# Patient Record
(demographics unavailable — no encounter records)

---

## 2024-11-25 NOTE — ASSESSMENT
[FreeTextEntry1] : Marie is a very pleasant 32 year old F referred for initial GI evaluation for elevated liver enzymes.  service used for this encounter. patient thought this was an appointment for her kids and did not bring her labs or records with her. has them at home, will bring PCP records.   she had c section and tubal ligation in April. 10 months prior to that she had c section for twin pregnancy. she reports back pain since her pregnancy. does not take NSAIds. However, she was sent home with Ibuprofen as her CS in April. no post prandial symptoms.   * we have requested from PCP to review re: consult, labs , etc.  She will bring the records she has at home  * She has not had labs over the past month and we will check liver enzymes as well as screening for viral hepatitis and autoimmune/ familial liver disease  * Ultrasound of abdomen ordered.   Reviewed and reconciled medications, allergies, PMHx, PSHx, SocHx, FMHx. Reviewed imaging, blood work, diagnostic testing, discussed with patient. Further recommendations pending results of above work up and evaluation. Recommendations reviewed with patient during this office visit, and all questions answered; Patient instructed on the importance of follow up and verbalizes understanding.  I personally performed the evaluation and management (E/M) services for this established patient who presents today with (a) new problem(s)/exacerbation of (an) existing condition(s). That E/M includes conducting the examination, assessing all new/exacerbated conditions, and establishing a new plan of care this new problem/exacerbated condition to be followed going forward.

## 2024-11-25 NOTE — PHYSICAL EXAM
[General Appearance - Alert] : alert [General Appearance - In No Acute Distress] : in no acute distress [Neck Appearance] : the appearance of the neck was normal [Neck Cervical Mass (___cm)] : no neck mass was observed [Jugular Venous Distention Increased] : there was no jugular-venous distention [Thyroid Diffuse Enlargement] : the thyroid was not enlarged [Thyroid Nodule] : there were no palpable thyroid nodules [Auscultation Breath Sounds / Voice Sounds] : lungs were clear to auscultation bilaterally [Heart Rate And Rhythm] : heart rate was normal and rhythm regular [Heart Sounds] : normal S1 and S2 [Heart Sounds Gallop] : no gallops [Murmurs] : no murmurs [Heart Sounds Pericardial Friction Rub] : no pericardial rub [Bowel Sounds] : normal bowel sounds [Abdomen Soft] : soft [Abdomen Tenderness] : non-tender [Abdomen Mass (___ Cm)] : no abdominal mass palpated [Skin Color & Pigmentation] : normal skin color and pigmentation [Skin Turgor] : normal skin turgor [] : no rash

## 2024-11-25 NOTE — HISTORY OF PRESENT ILLNESS
[Needlestick Exposure] : no needlestick exposure [Infected Sexual Partner] : no infected sexual partner [IV Drug Use] : no IV drug use [Hemodialysis] : no hemodialysis [Transfusion before 1992] : no transfusion before 1992 [Transplant before 1992] : no transplant before 1992 [Incarceration] : no incarceration [Alcohol Abuse] : no alcohol abuse [Autoimmune Disorder] : no autoimmune disorder [Household Contact to HBV] : no household contact to HBV [Travel to Endemic Area] : no travel to an endemic area [Occupational Exposure] : no occupational exposure [Cocaine Use] : no cocaine use [de-identified] : Marie is a pleasant 32 year old F referred for initial GI evaluation for elevated liver enzymes.  service used for this encounter. patient thought this was an appointment for her kids and did not bring her labs or records with her. has them at home, will bring PCP records.   she had c section and tubal ligation in April. 10 months prior to that she had c section for twin pregnancy. she reports back pain since her pregnancy. does not take NSAIds. However, she was sent home with Ibuprofen as her CS in April. no post prandial symptoms.

## 2024-11-25 NOTE — REASON FOR VISIT
[Initial Evaluation] : an initial evaluation [Source: ______] : History obtained from [unfilled] [FreeTextEntry1] : elevated liver enzymes